# Patient Record
Sex: MALE | Race: WHITE | URBAN - METROPOLITAN AREA
[De-identification: names, ages, dates, MRNs, and addresses within clinical notes are randomized per-mention and may not be internally consistent; named-entity substitution may affect disease eponyms.]

---

## 2017-02-13 ENCOUNTER — OFFICE VISIT (OUTPATIENT)
Dept: CARDIOLOGY | Facility: CLINIC | Age: 52
End: 2017-02-13
Attending: INTERNAL MEDICINE
Payer: COMMERCIAL

## 2017-02-13 VITALS
HEIGHT: 70 IN | DIASTOLIC BLOOD PRESSURE: 80 MMHG | HEART RATE: 52 BPM | SYSTOLIC BLOOD PRESSURE: 140 MMHG | BODY MASS INDEX: 30.49 KG/M2 | WEIGHT: 213 LBS

## 2017-02-13 DIAGNOSIS — E78.2 MIXED HYPERLIPIDEMIA: ICD-10-CM

## 2017-02-13 DIAGNOSIS — E88.810 DYSMETABOLIC SYNDROME X: ICD-10-CM

## 2017-02-13 DIAGNOSIS — I21.02 ST ELEVATION MYOCARDIAL INFARCTION INVOLVING LEFT ANTERIOR DESCENDING (LAD) CORONARY ARTERY (H): ICD-10-CM

## 2017-02-13 DIAGNOSIS — I25.10 CORONARY ARTERY DISEASE INVOLVING NATIVE CORONARY ARTERY OF NATIVE HEART WITHOUT ANGINA PECTORIS: ICD-10-CM

## 2017-02-13 LAB
ALT SERPL W P-5'-P-CCNC: 15 U/L (ref 5–30)
CHOLEST SERPL-MCNC: 150 MG/DL
HDLC SERPL-MCNC: 37 MG/DL
LDLC SERPL CALC-MCNC: 90 MG/DL
NONHDLC SERPL-MCNC: 113 MG/DL
TRIGL SERPL-MCNC: 114 MG/DL

## 2017-02-13 PROCEDURE — 36415 COLL VENOUS BLD VENIPUNCTURE: CPT | Performed by: INTERNAL MEDICINE

## 2017-02-13 PROCEDURE — 80061 LIPID PANEL: CPT | Performed by: INTERNAL MEDICINE

## 2017-02-13 PROCEDURE — 99214 OFFICE O/P EST MOD 30 MIN: CPT | Performed by: INTERNAL MEDICINE

## 2017-02-13 PROCEDURE — 84460 ALANINE AMINO (ALT) (SGPT): CPT | Performed by: INTERNAL MEDICINE

## 2017-02-13 NOTE — LETTER
2017         Vicente Flores MD   [facility]   [address] [place] [address]      RE: Karan Pedroza   MRN: [id number]   : 1965      Dear Dr. Flores:      I again had the pleasure of seeing your patient, Karan Pedroza, at [facility] for evaluation of coronary artery disease and mixed hyperlipidemia.  The patient is status post anterior wall myocardial infarction in 2010.  Coronary angiography demonstrated 100% occlusion of his LAD which was then stented.  The patient was only 44 years old at that time and was a smoker with mixed hyperlipidemia.  He had only mild disease in his other arteries.  A stress echocardiogram 1 year ago was normal without significant ischemia or infarction.  He had been on Niaspan for his mixed hyperlipidemia and we changed this to atorvastatin and fenofibrate.  He notes the stress of his life over the last few years has improved.  He is, however, putting his house on the market and continues a 6-month project of building a bathroom in his house.  He avoids fast food but does not always eat food that is low in saturated fat or salt.  He has not been hospitalized or had surgery since I saw him 1 year ago.  He is not performing any significant aerobic exercise.  He denies PND, orthopnea, peripheral edema, syncope, presyncope or palpitations.  We are going to repeat his fasting lipid profile today.      PHYSICAL EXAMINATION:   VITAL SIGNS:  Current blood pressure is 140/80, pulse is 52 and regular.  Weight is 213 pounds, BMI is 31.   CHEST:  Clear to auscultation.   CARDIAC:  Regular rate and rhythm, normal S1 and S2 with an S4 gallop but no S3 or murmur.  No JVD.  Pulses were all intact without bruits.   ABDOMEN:  Benign without organomegaly.  Bowel sounds are present.  No bruits.   EXTREMITIES:  Without cyanosis, clubbing or edema.      ASSESSMENT:   1.  Karan Pedroza is a delightful 51-year-old male with known coronary  artery disease, status post previous anterior wall myocardial infarction.  His stress echocardiogram was normal without any significant infarct or ischemia.  There were no significant valvular issues and we will continue to treat medically.  I have encouraged him to begin an exercise program.   2.  Mixed hyperlipidemia.  Certainly this may be genetic versus lifestyle.  I did spend more than 50% of our 30-minute visit today counseling him on diet and exercise and atherosclerosis.  We will also check his fasting lipid profile and ALT today since he is off Niaspan and instead just on fenofibrate and atorvastatin.      It is my pleasure to assist in the care of Karan Pedroza.  We are trying to be aggressive with his risk factors.  His blood pressure is borderline at this time and we will continue to monitor.  He may need more aggressive therapy with the use of lisinopril.  All his questions were answered to his satisfaction.      Sincerely,         Carlitos Linares MD

## 2017-02-13 NOTE — MR AVS SNAPSHOT
After Visit Summary   2/13/2017    Karan Pedroza    MRN: [id number]           Patient Information     Date Of Birth          1965        Visit Information        Provider Department      2/13/2017 11:15 AM Carlitos Linares MD [facility]        Today's Diagnoses     Coronary artery disease involving native coronary artery of native heart without angina pectoris        ST elevation myocardial infarction involving left anterior descending (LAD) coronary artery (H)        Mixed hyperlipidemia        Dysmetabolic syndrome X           Follow-ups after your visit        Additional Services     Follow-Up with Cardiologist                 Future tests that were ordered for you today     Open Future Orders        Priority Expected Expires Ordered    Follow-Up with Cardiologist Routine 2/13/2018 6/28/2018 2/13/2017    Lipid Profile Routine 2/13/2017 2/13/2018 2/13/2017    ALT Routine 2/13/2017 2/13/2018 2/13/2017            Who to contact     If you have questions or need follow up information about today's clinic visit or your schedule please contact [facility] directly at [phone].  Normal or non-critical lab and imaging results will be communicated to you by [employer], letter or phone within 4 business days after the clinic has received the results. If you do not hear from us within 7 days, please contact the clinic through [employer] or phone. If you have a critical or abnormal lab result, we will notify you by phone as soon as possible.  Submit refill requests through [employer] or call your pharmacy and they will forward the refill request to us. Please allow 3 business days for your refill to be completed.          Additional Information About Your Visit        [employer] Information     [employer] lets you send messages to your doctor, view your test results, renew your prescriptions, schedule appointments and more.  "To sign up, go to [email or web address] . Click on \"Log in\" on the left side of the screen, which will take you to the Welcome page. Then click on \"Sign up Now\" on the right side of the page.     You will be asked to enter the access code listed below, as well as some personal information. Please follow the directions to create your username and password.     Your access code is: [email or web address]  Expires: 2017 11:57 AM     Your access code will  in 90 days. If you need help or a new code, please call your [facility] clinic or [phone].        Care EveryWhere ID     This is your Care EveryWhere ID. This could be used by other organizations to access your [facility] medical records  [id number]        Your Vitals Were     Pulse Height BMI (Body Mass Index)             52 1.765 m (5' 9.5\") 31 kg/m2          Blood Pressure from Last 3 Encounters:   17 140/80   16 145/90   07/10/14 134/80    Weight from Last 3 Encounters:   17 96.6 kg (213 lb)   16 93.9 kg (207 lb)   07/10/14 91.6 kg (202 lb)              We Performed the Following     Follow-Up with Cardiologist        Primary Care Provider Office Phone # Fax #    Vicente Singh Wilver Flores [phone]       [facility] [address] [facility] [address]  [facility] [address]        Thank you!     Thank you for choosing [facility] [place]  for your care. Our goal is always to provide you with excellent care. Hearing back from our patients is one way we can continue to improve our services. Please take a few minutes to complete the written survey that you may receive in the mail after your visit with us. Thank you!             Your Updated Medication List - Protect others around you: Learn how to safely use, store and throw away your medicines at www.disposemymeds.org.          This list is accurate as of: 17 11:57 AM.  Always use your most recent med list.                   " Brand Name Dispense Instructions for use    aspirin 325 MG EC tablet      1 po qd       atorvastatin 40 MG tablet    LIPITOR    90 tablet    Take 1 tablet (40 mg) by mouth daily       fenofibrate 160 MG tablet     90 tablet    Take 1 tablet (160 mg) by mouth daily       hydrocortisone 2.5 % cream    ANUSOL-HC    60 g    Place rectally 2 times daily       metoprolol 50 MG 24 hr tablet    TOPROL-XL    90 tablet    Take 1 tablet (50 mg) by mouth daily       nitroglycerin 0.4 MG sublingual tablet    NITROSTAT    60 tablet    Place 1 tablet (0.4 mg) under the tongue every 5 minutes as needed up to 3 tablets per episode.

## 2017-02-13 NOTE — PROGRESS NOTES
HPI and Plan:   See dictation:[id number]    Orders Placed This Encounter   Procedures     Lipid Profile     ALT     Follow-Up with Cardiologist       No orders of the defined types were placed in this encounter.      Medications Discontinued During This Encounter   Medication Reason     fish oil-omega-3 fatty acids (FISH OIL) 1000 MG capsule Stopped by Patient         Encounter Diagnoses   Name Primary?     Coronary artery disease involving native coronary artery of native heart without angina pectoris      ST elevation myocardial infarction involving left anterior descending (LAD) coronary artery (H)      Mixed hyperlipidemia      Dysmetabolic syndrome X        CURRENT MEDICATIONS:  Current Outpatient Prescriptions   Medication Sig Dispense Refill     atorvastatin (LIPITOR) 40 MG tablet Take 1 tablet (40 mg) by mouth daily 90 tablet 0     fenofibrate 160 MG tablet Take 1 tablet (160 mg) by mouth daily 90 tablet 0     metoprolol (TOPROL-XL) 50 MG 24 hr tablet Take 1 tablet (50 mg) by mouth daily 90 tablet 0     nitroglycerin (NITROSTAT) 0.4 MG SL tablet Place 1 tablet (0.4 mg) under the tongue every 5 minutes as needed up to 3 tablets per episode. 60 tablet 3     ASPIRIN 325 MG OR TBEC 1 po qd       hydrocortisone (ANUSOL-HC) 2.5 % rectal cream Place rectally 2 times daily 60 g 1       ALLERGIES     Allergies   Allergen Reactions     No Known Drug Allergies        PAST MEDICAL HISTORY:  Past Medical History   Diagnosis Date     Coronary artery disease      REHANA to LAD 1/2010 w/thrombectomy     HLD (hyperlipidemia)      Hypertriglyceridemia 7/14/2012       PAST SURGICAL HISTORY:  Past Surgical History   Procedure Laterality Date     Hc left heart catheterization  1/2010     REHANA to LAD, thrombectomy       FAMILY HISTORY:  Family History   Problem Relation Age of Onset     Neurologic Disorder Mother      slight tremor     C.A.D. Father      has had bypass surgerys done     Hypertension Father      HEART DISEASE Father       "Cancer - colorectal No family hx of      Prostate Cancer No family hx of      DIABETES No family hx of      Hypertension Sister        SOCIAL HISTORY:  Social History     Social History     Marital status:      Spouse name: N/A     Number of children: N/A     Years of education: N/A     Social History Main Topics     Smoking status: Former Smoker     Smokeless tobacco: Never Used      Comment: quit 2001     Alcohol use 0.0 oz/week     0 Standard drinks or equivalent per week      Comment:   Occassional beer with friends     Drug use: No     Sexual activity: Yes     Partners: Female     Other Topics Concern     Caffeine Concern Not Asked     2 cans of diet coke a day or occ. decaf coffee in the am     Exercise No     Social History Narrative       Review of Systems:  Skin:  Negative       Eyes:  Positive for contacts    ENT:  Negative      Respiratory:  Negative       Cardiovascular:  Negative      Gastroenterology: Negative      Genitourinary:  not assessed      Musculoskeletal:  Negative      Neurologic:  Negative      Psychiatric:  Negative      Heme/Lymph/Imm:  Negative      Endocrine:  Negative        Physical Exam:  Vitals: /80 (BP Location: Right arm, Cuff Size: Adult Large)  Pulse 52  Ht 1.765 m (5' 9.5\")  Wt 96.6 kg (213 lb)  BMI 31 kg/m2    Constitutional:  cooperative, alert and oriented, well developed, well nourished, in no acute distress        Skin:  warm and dry to the touch, no apparent skin lesions or masses noted        Head:  normocephalic, no masses or lesions        Eyes:  pupils equal and round, conjunctivae and lids unremarkable, sclera white, no xanthalasma, EOMS intact, no nystagmus        ENT:  no pallor or cyanosis, dentition good        Neck:  carotid pulses are full and equal bilaterally, JVP normal, no carotid bruit, no thyromegaly        Chest:  normal breath sounds, clear to auscultation, normal A-P diameter, normal symmetry, normal respiratory excursion, no use of " accessory muscles          Cardiac: regular rhythm;normal S1 and S2;apical impulse not displaced   S4 no presence of murmur            Abdomen:  abdomen soft, non-tender, BS normoactive, no mass, no HSM, no bruits        Vascular: pulses full and equal, no bruits auscultated;pulses full and equal                                        Extremities and Back:  no deformities, clubbing, cyanosis, erythema observed;no edema              Neurological:  no gross motor deficits;affect appropriate, oriented to time, person and place                Vicente Flores MD  [facility]  [address] [address]

## 2017-02-13 NOTE — PROGRESS NOTES
2017         Vicente Flores MD   [facility]   [address] [place] [address]      RE: Karan Pedroza   MRN: [id number]   : 1965      Dear Dr. Flores:      I again had the pleasure of seeing your patient, Karan Pedroza, at [facility] for evaluation of coronary artery disease and mixed hyperlipidemia.  The patient is status post anterior wall myocardial infarction in 2010.  Coronary angiography demonstrated 100% occlusion of his LAD which was then stented.  The patient was only 44 years old at that time and was a smoker with mixed hyperlipidemia.  He had only mild disease in his other arteries.  A stress echocardiogram 1 year ago was normal without significant ischemia or infarction.  He had been on Niaspan for his mixed hyperlipidemia and we changed this to atorvastatin and fenofibrate.  He notes the stress of his life over the last few years has improved.  He is, however, putting his house on the market and continues a 6-month project of building a bathroom in his house.  He avoids fast food but does not always eat food that is low in saturated fat or salt.  He has not been hospitalized or had surgery since I saw him 1 year ago.  He is not performing any significant aerobic exercise.  He denies PND, orthopnea, peripheral edema, syncope, presyncope or palpitations.  We are going to repeat his fasting lipid profile today.      PHYSICAL EXAMINATION:   VITAL SIGNS:  Current blood pressure is 140/80, pulse is 52 and regular.  Weight is 213 pounds, BMI is 31.   CHEST:  Clear to auscultation.   CARDIAC:  Regular rate and rhythm, normal S1 and S2 with an S4 gallop but no S3 or murmur.  No JVD.  Pulses were all intact without bruits.   ABDOMEN:  Benign without organomegaly.  Bowel sounds are present.  No bruits.   EXTREMITIES:  Without cyanosis, clubbing or edema.      ASSESSMENT:   1.  Karan Pedroza is a delightful 51-year-old male with known coronary  artery disease, status post previous anterior wall myocardial infarction.  His stress echocardiogram was normal without any significant infarct or ischemia.  There were no significant valvular issues and we will continue to treat medically.  I have encouraged him to begin an exercise program.   2.  Mixed hyperlipidemia.  Certainly this may be genetic versus lifestyle.  I did spend more than 50% of our 30-minute visit today counseling him on diet and exercise and atherosclerosis.  We will also check his fasting lipid profile and ALT today since he is off Niaspan and instead just on fenofibrate and atorvastatin.      It is my pleasure to assist in the care of Karan Pedroza.  We are trying to be aggressive with his risk factors.  His blood pressure is borderline at this time and we will continue to monitor.  He may need more aggressive therapy with the use of lisinopril.  All his questions were answered to his satisfaction.      Sincerely,         MD EDITH Patel MD, [facility]             D: 2017 12:06   T: 2017 16:02   MT: al      Name:     KARAN PEDROZA   MRN:      [id number]        Account:      [id number]   :      1965           Service Date: 2017      Document: [id number]

## 2017-02-14 ENCOUNTER — TELEPHONE (OUTPATIENT)
Dept: CARDIOLOGY | Facility: CLINIC | Age: 52
End: 2017-02-14

## 2017-02-14 DIAGNOSIS — E78.5 HYPERLIPIDEMIA LDL GOAL <100: Primary | ICD-10-CM

## 2017-02-14 NOTE — LETTER
February 15, 2017       TO: Karan Reyes Kasia   [address] [address]       Dear Mr. Pedroza,    The results of your recent Laboratory tests.    Results for orders placed or performed in visit on 02/13/17   Lipid Profile   Result Value Ref Range    Cholesterol 150 <200 mg/dL    Triglycerides 114 <150 mg/dL    HDL Cholesterol 37 (L) >39 mg/dL    LDL Cholesterol Calculated 90 <100 mg/dL    Non HDL Cholesterol 113 <130 mg/dL   ALT   Result Value Ref Range    ALT 15 5 - 30 U/L            Your test results fall within the expected range(s) or remain unchanged from previous results.  Please continue with current treatment plan. Will recheck lipid profile and ALT in one year.       Sincerely,    [facility] [place]

## 2017-02-14 NOTE — TELEPHONE ENCOUNTER
" Per Dr. Linares :\"Good results with increased HDL and LDL<100.  Normal triglycerides.   Normal ALT.   Will continue with our current medical therapy and recheck FLP in one year.\"    Attempted to call pt with above results. No answer left vm to call team 4 back directly.   "

## 2017-02-21 DIAGNOSIS — I25.10 CAD (CORONARY ARTERY DISEASE): ICD-10-CM

## 2017-02-21 DIAGNOSIS — E78.5 HYPERLIPIDEMIA LDL GOAL <100: Primary | ICD-10-CM

## 2017-02-21 RX ORDER — METOPROLOL SUCCINATE 50 MG/1
50 TABLET, EXTENDED RELEASE ORAL DAILY
Qty: 90 TABLET | Refills: 3 | Status: SHIPPED | OUTPATIENT
Start: 2017-02-21

## 2017-02-21 RX ORDER — ATORVASTATIN CALCIUM 40 MG/1
40 TABLET, FILM COATED ORAL DAILY
Qty: 90 TABLET | Refills: 3 | Status: SHIPPED | OUTPATIENT
Start: 2017-02-21

## 2017-03-20 DIAGNOSIS — E78.2 MIXED HYPERLIPIDEMIA: ICD-10-CM

## 2017-03-20 RX ORDER — FENOFIBRATE 160 MG/1
160 TABLET ORAL DAILY
Qty: 90 TABLET | Refills: 3 | Status: SHIPPED | OUTPATIENT
Start: 2017-03-20 | End: 2017-05-12

## 2017-05-12 DIAGNOSIS — E78.2 MIXED HYPERLIPIDEMIA: ICD-10-CM

## 2017-05-12 RX ORDER — FENOFIBRATE 160 MG/1
160 TABLET ORAL DAILY
Qty: 90 TABLET | Refills: 1 | Status: SHIPPED | OUTPATIENT
Start: 2017-05-12